# Patient Record
Sex: MALE | Race: WHITE | NOT HISPANIC OR LATINO | Employment: OTHER | URBAN - METROPOLITAN AREA
[De-identification: names, ages, dates, MRNs, and addresses within clinical notes are randomized per-mention and may not be internally consistent; named-entity substitution may affect disease eponyms.]

---

## 2021-04-21 ENCOUNTER — TELEPHONE ENCOUNTER (OUTPATIENT)
Dept: URBAN - METROPOLITAN AREA CLINIC 68 | Facility: CLINIC | Age: 86
End: 2021-04-21

## 2022-06-04 ENCOUNTER — TELEPHONE ENCOUNTER (OUTPATIENT)
Dept: URBAN - METROPOLITAN AREA CLINIC 68 | Facility: CLINIC | Age: 87
End: 2022-06-04

## 2022-06-04 RX ORDER — RABEPRAZOLE SODIUM 20 MG/1
TABLET, DELAYED RELEASE ORAL AS DIRECTED
OUTPATIENT
Start: 2009-03-17 | End: 2017-01-24

## 2022-06-04 RX ORDER — RABEPRAZOLE SODIUM 20 MG/1
ACIPHEX(    AS DIRECTED ) INACTIVE -HX ENTRY TABLET, DELAYED RELEASE ORAL AS DIRECTED
OUTPATIENT
Start: 2009-03-13

## 2022-06-04 RX ORDER — FINASTERIDE 1 MG
TABLET ORAL
OUTPATIENT
Start: 2009-03-04 | End: 2017-01-24

## 2022-06-05 ENCOUNTER — TELEPHONE ENCOUNTER (OUTPATIENT)
Dept: URBAN - METROPOLITAN AREA CLINIC 68 | Facility: CLINIC | Age: 87
End: 2022-06-05

## 2022-06-05 RX ORDER — ENALAPRIL MALEATE 20 MG/1
ENALAPRIL MALEATE( 20MG ORAL  DAILY ) ACTIVE -HX ENTRY TABLET ORAL DAILY
Status: ACTIVE | COMMUNITY
Start: 2017-01-24

## 2022-06-05 RX ORDER — COLCHICINE 0.6 MG/1
COLCRYS( 0.6MG ORAL  AS DIRECTED ) ACTIVE -HX ENTRY TABLET, FILM COATED ORAL AS DIRECTED
Status: ACTIVE | COMMUNITY
Start: 2017-01-24

## 2022-06-05 RX ORDER — BRINZOLAMIDE/BRIMONIDINE TARTRATE 10; 2 MG/ML; MG/ML
SIMBRINZA( 1-0.2% OPHTHALMIC  AS DIRECTED ) ACTIVE -HX ENTRY SUSPENSION/ DROPS OPHTHALMIC AS DIRECTED
Status: ACTIVE | COMMUNITY
Start: 2017-01-24

## 2022-06-05 RX ORDER — ALLOPURINOL 300 MG/1
ALLOPURINOL( 300MG ORAL  DAILY ) ACTIVE -HX ENTRY TABLET ORAL DAILY
Status: ACTIVE | COMMUNITY
Start: 2017-01-24

## 2022-06-05 RX ORDER — NAPROXEN 500 MG/1
NAPROXEN( 500MG ORAL  AS DIRECTED ) ACTIVE -HX ENTRY TABLET ORAL AS DIRECTED
Status: ACTIVE | COMMUNITY
Start: 2017-01-24

## 2022-06-25 ENCOUNTER — TELEPHONE ENCOUNTER (OUTPATIENT)
Age: 87
End: 2022-06-25

## 2022-06-25 RX ORDER — FINASTERIDE 1 MG/1
TABLET, FILM COATED ORAL
OUTPATIENT
Start: 2009-03-04 | End: 2017-01-24

## 2022-06-25 RX ORDER — COLCHICINE 0.6 MG/1
COLCHICINE(    AS NEEDED ) INACTIVE -HX ENTRY CAPSULE ORAL AS NEEDED
OUTPATIENT
Start: 2009-03-04

## 2022-06-25 RX ORDER — RABEPRAZOLE SODIUM 20 MG/1
TABLET, DELAYED RELEASE ORAL AS DIRECTED
OUTPATIENT
Start: 2009-03-17 | End: 2017-01-24

## 2022-06-25 RX ORDER — RABEPRAZOLE SODIUM 20 MG/1
ACIPHEX(    AS DIRECTED ) INACTIVE -HX ENTRY TABLET, DELAYED RELEASE ORAL AS DIRECTED
OUTPATIENT
Start: 2009-03-13

## 2022-06-25 RX ORDER — BISACODYL 5 MG/1
HALFLYTELY & BISACODYL(    TAKE 2 BISACODYL TABLETS @ NOON, START HALFLYTELY BY 4 PM ) INACTIVE -HX ENTRY TABLET, COATED ORAL
OUTPATIENT
Start: 2009-03-04

## 2022-06-26 ENCOUNTER — TELEPHONE ENCOUNTER (OUTPATIENT)
Age: 87
End: 2022-06-26

## 2022-06-26 RX ORDER — COLCHICINE 0.6 MG/1
COLCRYS( 0.6MG ORAL  AS DIRECTED ) ACTIVE -HX ENTRY TABLET, FILM COATED ORAL AS DIRECTED
Status: ACTIVE | COMMUNITY
Start: 2017-01-24

## 2022-06-26 RX ORDER — CETIRIZINE HYDROCHLORIDE 10 MG/1
ZYRTEC( 10MG ORAL  AS NEEDED ) ACTIVE -HX ENTRY TABLET, FILM COATED ORAL AS NEEDED
Status: ACTIVE | COMMUNITY
Start: 2017-01-24

## 2022-06-26 RX ORDER — FLUTICASONE FUROATE AND VILANTEROL 100; 25 UG/1; UG/1
FLUTICASONE PROPIONATE (NASAL)( 50MCG/DOSE NASAL  AS DIRECTED ) ACTIVE -HX ENTRY POWDER RESPIRATORY (INHALATION) AS DIRECTED
Status: ACTIVE | COMMUNITY
Start: 2017-01-24

## 2022-06-26 RX ORDER — ALLOPURINOL 300 MG/1
ALLOPURINOL( 300MG ORAL  DAILY ) ACTIVE -HX ENTRY TABLET ORAL DAILY
Status: ACTIVE | COMMUNITY
Start: 2017-01-24

## 2022-06-26 RX ORDER — ENALAPRIL MALEATE 20 MG/1
ENALAPRIL MALEATE( 20MG ORAL  DAILY ) ACTIVE -HX ENTRY TABLET ORAL DAILY
Status: ACTIVE | COMMUNITY
Start: 2017-01-24

## 2022-06-26 RX ORDER — BRINZOLAMIDE/BRIMONIDINE TARTRATE 10; 2 MG/ML; MG/ML
SIMBRINZA( 1-0.2% OPHTHALMIC  AS DIRECTED ) ACTIVE -HX ENTRY SUSPENSION/ DROPS OPHTHALMIC AS DIRECTED
Status: ACTIVE | COMMUNITY
Start: 2017-01-24

## 2022-06-26 RX ORDER — NAPROXEN 500 MG/1
NAPROXEN( 500MG ORAL  AS DIRECTED ) ACTIVE -HX ENTRY TABLET ORAL AS DIRECTED
Status: ACTIVE | COMMUNITY
Start: 2017-01-24

## 2022-06-30 ENCOUNTER — OFFICE VISIT (OUTPATIENT)
Dept: URBAN - METROPOLITAN AREA CLINIC 68 | Facility: CLINIC | Age: 87
End: 2022-06-30

## 2022-07-19 ENCOUNTER — OFFICE VISIT (OUTPATIENT)
Dept: URBAN - METROPOLITAN AREA CLINIC 68 | Facility: CLINIC | Age: 87
End: 2022-07-19

## 2022-07-19 RX ORDER — ALLOPURINOL 300 MG/1
ALLOPURINOL( 300MG ORAL  DAILY ) ACTIVE -HX ENTRY TABLET ORAL DAILY
Status: ACTIVE | COMMUNITY
Start: 2017-01-24

## 2022-07-19 RX ORDER — ENALAPRIL MALEATE 20 MG/1
ENALAPRIL MALEATE( 20MG ORAL  DAILY ) ACTIVE -HX ENTRY TABLET ORAL DAILY
Status: ACTIVE | COMMUNITY
Start: 2017-01-24

## 2022-07-19 RX ORDER — ALENDRONATE SODIUM 70 MG/1
TABLET ORAL
Qty: 12 TABLET | Status: ACTIVE | COMMUNITY

## 2022-07-19 RX ORDER — BRINZOLAMIDE/BRIMONIDINE TARTRATE 10; 2 MG/ML; MG/ML
SIMBRINZA( 1-0.2% OPHTHALMIC  AS DIRECTED ) ACTIVE -HX ENTRY SUSPENSION/ DROPS OPHTHALMIC AS DIRECTED
Status: ACTIVE | COMMUNITY
Start: 2017-01-24

## 2022-07-19 RX ORDER — COLCHICINE 0.6 MG/1
COLCRYS( 0.6MG ORAL  AS DIRECTED ) ACTIVE -HX ENTRY TABLET, FILM COATED ORAL AS DIRECTED
Status: ON HOLD | COMMUNITY
Start: 2017-01-24

## 2022-07-19 RX ORDER — NAPROXEN 500 MG/1
NAPROXEN( 500MG ORAL  AS DIRECTED ) ACTIVE -HX ENTRY TABLET ORAL AS DIRECTED
Status: DISCONTINUED | COMMUNITY
Start: 2017-01-24

## 2022-07-19 NOTE — HPI-MIGRATED HPI
General : Recurrent  dysphagia, food bolus obstruction , 6 weeks ago , had to regurgitate, long history recurrent symptoms for 50 yrs, at [facility]  Told he has a stenosis, gets dilation for symptoms every 6 years

## 2022-08-25 ENCOUNTER — TELEPHONE ENCOUNTER (OUTPATIENT)
Dept: URBAN - METROPOLITAN AREA CLINIC 68 | Facility: CLINIC | Age: 87
End: 2022-08-25

## 2022-08-26 ENCOUNTER — OFFICE VISIT (OUTPATIENT)
Dept: URBAN - METROPOLITAN AREA SURGERY CENTER 12 | Facility: SURGERY CENTER | Age: 87
End: 2022-08-26

## 2022-08-26 ENCOUNTER — TELEPHONE ENCOUNTER (OUTPATIENT)
Dept: URBAN - METROPOLITAN AREA CLINIC 68 | Facility: CLINIC | Age: 87
End: 2022-08-26

## 2022-08-26 RX ORDER — OMEPRAZOLE 20 MG/1
1 CAPSULE CAPSULE, DELAYED RELEASE ORAL ONCE A DAY
Qty: 30 CAPSULE | Refills: 3 | OUTPATIENT
Start: 2022-08-26

## 2022-08-26 RX ORDER — COLCHICINE 0.6 MG/1
COLCRYS( 0.6MG ORAL  AS DIRECTED ) ACTIVE -HX ENTRY TABLET, FILM COATED ORAL AS DIRECTED
Status: ON HOLD | COMMUNITY
Start: 2017-01-24

## 2022-08-26 RX ORDER — ALENDRONATE SODIUM 70 MG/1
TABLET ORAL
Qty: 12 TABLET | Status: ACTIVE | COMMUNITY

## 2022-08-26 RX ORDER — ENALAPRIL MALEATE 20 MG/1
ENALAPRIL MALEATE( 20MG ORAL  DAILY ) ACTIVE -HX ENTRY TABLET ORAL DAILY
Status: ACTIVE | COMMUNITY
Start: 2017-01-24

## 2022-08-26 RX ORDER — BRINZOLAMIDE/BRIMONIDINE TARTRATE 10; 2 MG/ML; MG/ML
SIMBRINZA( 1-0.2% OPHTHALMIC  AS DIRECTED ) ACTIVE -HX ENTRY SUSPENSION/ DROPS OPHTHALMIC AS DIRECTED
Status: ACTIVE | COMMUNITY
Start: 2017-01-24

## 2022-08-26 RX ORDER — ALLOPURINOL 300 MG/1
ALLOPURINOL( 300MG ORAL  DAILY ) ACTIVE -HX ENTRY TABLET ORAL DAILY
Status: ACTIVE | COMMUNITY
Start: 2017-01-24

## 2022-11-14 ENCOUNTER — DASHBOARD ENCOUNTERS (OUTPATIENT)
Age: 87
End: 2022-11-14

## 2022-11-14 ENCOUNTER — OFFICE VISIT (OUTPATIENT)
Dept: URBAN - METROPOLITAN AREA CLINIC 68 | Facility: CLINIC | Age: 87
End: 2022-11-14

## 2022-11-14 RX ORDER — COLCHICINE 0.6 MG/1
COLCRYS( 0.6MG ORAL  AS DIRECTED ) ACTIVE -HX ENTRY TABLET, FILM COATED ORAL AS DIRECTED
Status: ON HOLD | COMMUNITY
Start: 2017-01-24

## 2022-11-14 RX ORDER — BRINZOLAMIDE/BRIMONIDINE TARTRATE 10; 2 MG/ML; MG/ML
SIMBRINZA( 1-0.2% OPHTHALMIC  AS DIRECTED ) ACTIVE -HX ENTRY SUSPENSION/ DROPS OPHTHALMIC AS DIRECTED
Status: ACTIVE | COMMUNITY
Start: 2017-01-24

## 2022-11-14 RX ORDER — ALLOPURINOL 300 MG/1
ALLOPURINOL( 300MG ORAL  DAILY ) ACTIVE -HX ENTRY TABLET ORAL DAILY
Status: ACTIVE | COMMUNITY
Start: 2017-01-24

## 2022-11-14 RX ORDER — ALENDRONATE SODIUM 70 MG/1
TABLET ORAL
Qty: 12 TABLET | Status: ACTIVE | COMMUNITY

## 2022-11-14 RX ORDER — OMEPRAZOLE 20 MG/1
1 CAPSULE CAPSULE, DELAYED RELEASE ORAL ONCE A DAY
Qty: 90 TABLET | Refills: 3
Start: 2022-08-26

## 2022-11-14 RX ORDER — OMEPRAZOLE 20 MG/1
1 CAPSULE CAPSULE, DELAYED RELEASE ORAL ONCE A DAY
Qty: 30 CAPSULE | Refills: 3 | Status: ACTIVE | COMMUNITY
Start: 2022-08-26

## 2022-11-14 RX ORDER — ALENDRONATE SODIUM 70 MG/1
TABLET ORAL
OUTPATIENT

## 2022-11-14 RX ORDER — ENALAPRIL MALEATE 20 MG/1
ENALAPRIL MALEATE( 20MG ORAL  DAILY ) ACTIVE -HX ENTRY TABLET ORAL DAILY
Status: ACTIVE | COMMUNITY
Start: 2017-01-24

## 2022-11-14 NOTE — HPI-MIGRATED HPI
General : SP dilation to 12 mm donaldory, did help   noticed after dilatoin impovement  8/26/2022    Recurrent  dysphagia, food bolus obstruction , 6 weeks ago , had to regurgitate, long history recurrent symptoms for 50 yrs, at [facility]  Told he has a stenosis, gets dilation for symptoms every 6 years

## 2022-11-14 NOTE — EXAM-MIGRATED EXAMINATIONS
General Examination: Musculoskeletal: -> no swelling, redness, warmth or tenderness of the shoulder(s) with full range of motion   Extremities: -> normal extremity with no clubbing, cyanosis or edema   Neurologic: -> nonfocal , alert and oriented   Skin: -> skin is warm and dry, with no rashes, good skin turgor and normal hair distribution   Heart: -> regular rate and rhythm without murmurs, gallops, clicks or rubs   Lungs: -> clear to auscultation bilaterally, with good air movement and no rales, rhonchi or wheezes   Chest: -> chest wall with no costochondral junction tenderness, no rib deformity and normal shape and expansion   Abdomen: -> soft with good bowel sounds, nontender, and no masses or hepatosplenomegaly   Rectal: -> not examined   Ears: -> normal   Nose: -> no lesions   Oral cavity: -> normal , mucosa moist , tongue is midline , with good dentition   Throat: -> clear   Neck / thyroid: -> neck is supple, with full range of motion and no cervical lymphadenopathy , no masses and/or tenderness , no jugular venous distention , trachea midline   Lymph nodes: -> no axillary, supraclavicular or inguinal lymphadenopathy   General appearance: -> alert, pleasant, well-nourished and in no acute distress   Head: -> normocephalic, atraumatic   Eyes: -> pupils equal, round, reactive to light and accommodation

## 2023-01-20 ENCOUNTER — OFFICE VISIT (OUTPATIENT)
Dept: URBAN - METROPOLITAN AREA SURGERY CENTER 12 | Facility: SURGERY CENTER | Age: 88
End: 2023-01-20

## 2023-01-20 RX ORDER — OMEPRAZOLE 20 MG/1
1 CAPSULE CAPSULE, DELAYED RELEASE ORAL ONCE A DAY
Qty: 90 TABLET | Refills: 3 | Status: ACTIVE | COMMUNITY
Start: 2022-08-26

## 2023-01-20 RX ORDER — COLCHICINE 0.6 MG/1
COLCRYS( 0.6MG ORAL  AS DIRECTED ) ACTIVE -HX ENTRY TABLET, FILM COATED ORAL AS DIRECTED
Status: ON HOLD | COMMUNITY
Start: 2017-01-24

## 2023-01-20 RX ORDER — ENALAPRIL MALEATE 20 MG/1
ENALAPRIL MALEATE( 20MG ORAL  DAILY ) ACTIVE -HX ENTRY TABLET ORAL DAILY
Status: ACTIVE | COMMUNITY
Start: 2017-01-24

## 2023-01-20 RX ORDER — BRINZOLAMIDE/BRIMONIDINE TARTRATE 10; 2 MG/ML; MG/ML
SIMBRINZA( 1-0.2% OPHTHALMIC  AS DIRECTED ) ACTIVE -HX ENTRY SUSPENSION/ DROPS OPHTHALMIC AS DIRECTED
Status: ACTIVE | COMMUNITY
Start: 2017-01-24

## 2023-01-20 RX ORDER — ALLOPURINOL 300 MG/1
ALLOPURINOL( 300MG ORAL  DAILY ) ACTIVE -HX ENTRY TABLET ORAL DAILY
Status: ACTIVE | COMMUNITY
Start: 2017-01-24